# Patient Record
Sex: MALE | Race: WHITE | Employment: UNEMPLOYED | URBAN - METROPOLITAN AREA
[De-identification: names, ages, dates, MRNs, and addresses within clinical notes are randomized per-mention and may not be internally consistent; named-entity substitution may affect disease eponyms.]

---
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
## 2022-04-06 ENCOUNTER — HOSPITAL ENCOUNTER (EMERGENCY)
Age: 22
Discharge: HOME | End: 2022-04-06
Attending: EMERGENCY MEDICINE
Payer: COMMERCIAL

## 2022-04-06 ENCOUNTER — APPOINTMENT (OUTPATIENT)
Dept: CT IMAGING | Age: 22
End: 2022-04-06
Payer: COMMERCIAL

## 2022-04-06 VITALS
SYSTOLIC BLOOD PRESSURE: 104 MMHG | OXYGEN SATURATION: 99 % | HEART RATE: 88 BPM | TEMPERATURE: 99.7 F | DIASTOLIC BLOOD PRESSURE: 62 MMHG | RESPIRATION RATE: 16 BRPM

## 2022-04-06 DIAGNOSIS — K52.9 ENTEROCOLITIS: Primary | ICD-10-CM

## 2022-04-06 LAB
ANION GAP SERPL CALCULATED.3IONS-SCNC: 11 MMOL/L (ref 7–16)
BACTERIA: NORMAL /HPF
BASOPHILS ABSOLUTE: 0.03 E9/L (ref 0–0.2)
BASOPHILS RELATIVE PERCENT: 0.2 % (ref 0–2)
BILIRUBIN URINE: NEGATIVE
BLOOD, URINE: ABNORMAL
BUN BLDV-MCNC: 15 MG/DL (ref 6–20)
CALCIUM SERPL-MCNC: 9.2 MG/DL (ref 8.6–10.2)
CHLORIDE BLD-SCNC: 104 MMOL/L (ref 98–107)
CLARITY: CLEAR
CO2: 24 MMOL/L (ref 22–29)
COLOR: YELLOW
CREAT SERPL-MCNC: 0.9 MG/DL (ref 0.7–1.2)
EOSINOPHILS ABSOLUTE: 0.05 E9/L (ref 0.05–0.5)
EOSINOPHILS RELATIVE PERCENT: 0.4 % (ref 0–6)
GFR AFRICAN AMERICAN: >60
GFR NON-AFRICAN AMERICAN: >60 ML/MIN/1.73
GLUCOSE BLD-MCNC: 119 MG/DL (ref 74–99)
GLUCOSE URINE: NEGATIVE MG/DL
HCT VFR BLD CALC: 51.2 % (ref 37–54)
HEMOGLOBIN: 17.3 G/DL (ref 12.5–16.5)
IMMATURE GRANULOCYTES #: 0.03 E9/L
IMMATURE GRANULOCYTES %: 0.2 % (ref 0–5)
KETONES, URINE: NEGATIVE MG/DL
LACTIC ACID: 1 MMOL/L (ref 0.5–2.2)
LEUKOCYTE ESTERASE, URINE: NEGATIVE
LIPASE: 14 U/L (ref 13–60)
LYMPHOCYTES ABSOLUTE: 0.7 E9/L (ref 1.5–4)
LYMPHOCYTES RELATIVE PERCENT: 5.8 % (ref 20–42)
MCH RBC QN AUTO: 29.3 PG (ref 26–35)
MCHC RBC AUTO-ENTMCNC: 33.8 % (ref 32–34.5)
MCV RBC AUTO: 86.6 FL (ref 80–99.9)
MONOCYTES ABSOLUTE: 0.88 E9/L (ref 0.1–0.95)
MONOCYTES RELATIVE PERCENT: 7.3 % (ref 2–12)
NEUTROPHILS ABSOLUTE: 10.4 E9/L (ref 1.8–7.3)
NEUTROPHILS RELATIVE PERCENT: 86.1 % (ref 43–80)
NITRITE, URINE: NEGATIVE
PDW BLD-RTO: 12.4 FL (ref 11.5–15)
PH UA: 6 (ref 5–9)
PLATELET # BLD: 117 E9/L (ref 130–450)
PMV BLD AUTO: 11 FL (ref 7–12)
POTASSIUM SERPL-SCNC: 4.1 MMOL/L (ref 3.5–5)
PROTEIN UA: NEGATIVE MG/DL
RBC # BLD: 5.91 E12/L (ref 3.8–5.8)
RBC UA: NORMAL /HPF (ref 0–2)
SODIUM BLD-SCNC: 139 MMOL/L (ref 132–146)
SPECIFIC GRAVITY UA: >=1.03 (ref 1–1.03)
UROBILINOGEN, URINE: 0.2 E.U./DL
WBC # BLD: 12.1 E9/L (ref 4.5–11.5)
WBC UA: NORMAL /HPF (ref 0–5)

## 2022-04-06 PROCEDURE — 2580000003 HC RX 258: Performed by: EMERGENCY MEDICINE

## 2022-04-06 PROCEDURE — 6360000004 HC RX CONTRAST MEDICATION: Performed by: RADIOLOGY

## 2022-04-06 PROCEDURE — 96375 TX/PRO/DX INJ NEW DRUG ADDON: CPT

## 2022-04-06 PROCEDURE — 6360000002 HC RX W HCPCS: Performed by: EMERGENCY MEDICINE

## 2022-04-06 PROCEDURE — 96376 TX/PRO/DX INJ SAME DRUG ADON: CPT

## 2022-04-06 PROCEDURE — 85025 COMPLETE CBC W/AUTO DIFF WBC: CPT

## 2022-04-06 PROCEDURE — 83605 ASSAY OF LACTIC ACID: CPT

## 2022-04-06 PROCEDURE — 83690 ASSAY OF LIPASE: CPT

## 2022-04-06 PROCEDURE — 96372 THER/PROPH/DIAG INJ SC/IM: CPT

## 2022-04-06 PROCEDURE — 96374 THER/PROPH/DIAG INJ IV PUSH: CPT

## 2022-04-06 PROCEDURE — 80048 BASIC METABOLIC PNL TOTAL CA: CPT

## 2022-04-06 PROCEDURE — 81001 URINALYSIS AUTO W/SCOPE: CPT

## 2022-04-06 PROCEDURE — 74177 CT ABD & PELVIS W/CONTRAST: CPT

## 2022-04-06 PROCEDURE — 99283 EMERGENCY DEPT VISIT LOW MDM: CPT

## 2022-04-06 RX ORDER — MORPHINE SULFATE 2 MG/ML
2 INJECTION, SOLUTION INTRAMUSCULAR; INTRAVENOUS ONCE
Status: COMPLETED | OUTPATIENT
Start: 2022-04-06 | End: 2022-04-06

## 2022-04-06 RX ORDER — ONDANSETRON 2 MG/ML
INJECTION INTRAMUSCULAR; INTRAVENOUS
Status: DISCONTINUED
Start: 2022-04-06 | End: 2022-04-06 | Stop reason: HOSPADM

## 2022-04-06 RX ORDER — FLUTICASONE PROPIONATE 50 MCG
1 SPRAY, SUSPENSION (ML) NASAL EVERY MORNING
COMMUNITY

## 2022-04-06 RX ORDER — CETIRIZINE HYDROCHLORIDE 10 MG/1
10 TABLET ORAL EVERY MORNING
COMMUNITY

## 2022-04-06 RX ORDER — AMOXICILLIN AND CLAVULANATE POTASSIUM 875; 125 MG/1; MG/1
1 TABLET, FILM COATED ORAL 2 TIMES DAILY
Qty: 14 TABLET | Refills: 0 | Status: SHIPPED | OUTPATIENT
Start: 2022-04-06 | End: 2022-04-13

## 2022-04-06 RX ORDER — ONDANSETRON 4 MG/1
4 TABLET, ORALLY DISINTEGRATING ORAL EVERY 8 HOURS PRN
Qty: 10 TABLET | Refills: 0 | Status: SHIPPED | OUTPATIENT
Start: 2022-04-06

## 2022-04-06 RX ORDER — PROMETHAZINE HYDROCHLORIDE 25 MG/ML
12.5 INJECTION, SOLUTION INTRAMUSCULAR; INTRAVENOUS ONCE
Status: COMPLETED | OUTPATIENT
Start: 2022-04-06 | End: 2022-04-06

## 2022-04-06 RX ORDER — 0.9 % SODIUM CHLORIDE 0.9 %
1000 INTRAVENOUS SOLUTION INTRAVENOUS ONCE
Status: COMPLETED | OUTPATIENT
Start: 2022-04-06 | End: 2022-04-06

## 2022-04-06 RX ORDER — ONDANSETRON 2 MG/ML
4 INJECTION INTRAMUSCULAR; INTRAVENOUS EVERY 6 HOURS PRN
Status: DISCONTINUED | OUTPATIENT
Start: 2022-04-06 | End: 2022-04-06

## 2022-04-06 RX ORDER — ONDANSETRON 2 MG/ML
4 INJECTION INTRAMUSCULAR; INTRAVENOUS ONCE
Status: COMPLETED | OUTPATIENT
Start: 2022-04-06 | End: 2022-04-06

## 2022-04-06 RX ORDER — ALBUTEROL SULFATE 90 UG/1
1-2 AEROSOL, METERED RESPIRATORY (INHALATION)
COMMUNITY

## 2022-04-06 RX ORDER — AZITHROMYCIN 250 MG/1
250 TABLET, FILM COATED ORAL SEE ADMIN INSTRUCTIONS
COMMUNITY
Start: 2022-04-02 | End: 2022-04-06

## 2022-04-06 RX ADMIN — ONDANSETRON 4 MG: 2 INJECTION INTRAMUSCULAR; INTRAVENOUS at 06:43

## 2022-04-06 RX ADMIN — IOPAMIDOL 75 ML: 755 INJECTION, SOLUTION INTRAVENOUS at 07:37

## 2022-04-06 RX ADMIN — PROMETHAZINE HYDROCHLORIDE 12.5 MG: 25 INJECTION INTRAMUSCULAR; INTRAVENOUS at 09:49

## 2022-04-06 RX ADMIN — MORPHINE SULFATE 2 MG: 2 INJECTION, SOLUTION INTRAMUSCULAR; INTRAVENOUS at 09:32

## 2022-04-06 RX ADMIN — MORPHINE SULFATE 2 MG: 2 INJECTION, SOLUTION INTRAMUSCULAR; INTRAVENOUS at 06:48

## 2022-04-06 RX ADMIN — SODIUM CHLORIDE 1000 ML: 9 INJECTION, SOLUTION INTRAVENOUS at 06:42

## 2022-04-06 ASSESSMENT — PAIN - FUNCTIONAL ASSESSMENT: PAIN_FUNCTIONAL_ASSESSMENT: 0-10

## 2022-04-06 ASSESSMENT — PAIN DESCRIPTION - LOCATION: LOCATION: ABDOMEN

## 2022-04-06 ASSESSMENT — PAIN SCALES - GENERAL
PAINLEVEL_OUTOF10: 7

## 2022-04-06 ASSESSMENT — PAIN DESCRIPTION - PAIN TYPE: TYPE: ACUTE PAIN

## 2022-04-06 ASSESSMENT — PAIN DESCRIPTION - DESCRIPTORS: DESCRIPTORS: SHARP;SORE

## 2022-04-06 NOTE — ED PROVIDER NOTES
HPI:  4/6/22,   Time: 6:42 AM EDT         Larene Sandhoff is a 25 y.o. male presenting to the ED for RLQ pain NV , beginning 5 hours ago. The complaint has been persistent, moderate in severity, and worsened by nothing. Patient sudden onset of nausea vomiting and right lower quadrant pain. Was recently treated last week for strep throat with Zithromax. Does have a close contact with similar symptoms. Mom states he has had surgery of his umbilical hernia. No other abdominal surgeries reported. He had one episode of loose stool upon arrival.  He has been vomiting gastric contents,NBNB. He denies hematuria. No hematochezia or melena reported. He has no chest pain shortness of breath. He does have a anaphylactic reaction to Ceclor and peanut oil. ROS:   Pertinent positives and negatives are stated within HPI, all other systems reviewed and are negative.  --------------------------------------------- PAST HISTORY ---------------------------------------------  Past Medical History:  has a past medical history of Asthma and Autism. Past Surgical History:  has a past surgical history that includes Tympanostomy tube placement. Social History:  reports that he has never smoked. He has never used smokeless tobacco. He reports that he does not drink alcohol and does not use drugs. Family History: family history is not on file. The patients home medications have been reviewed.     Allergies: Bee venom, Ceclor [cefaclor], Peanuts [peanut oil], and [place] [macadamia nut oil]    -------------------------------------------------- RESULTS -------------------------------------------------  All laboratory and radiology results have been personally reviewed by myself   LABS:  Results for orders placed or performed during the hospital encounter of 04/06/22   CBC with Auto Differential   Result Value Ref Range    WBC 12.1 (H) 4.5 - 11.5 E9/L    RBC 5.91 (H) 3.80 - 5.80 E12/L    Hemoglobin 17.3 (H) 12.5 - 16.5 g/dL    Hematocrit 51.2 37.0 - 54.0 %    MCV 86.6 80.0 - 99.9 fL    MCH 29.3 26.0 - 35.0 pg    MCHC 33.8 32.0 - 34.5 %    RDW 12.4 11.5 - 15.0 fL    Platelets 004 (L) 431 - 450 E9/L    MPV 11.0 7.0 - 12.0 fL    Neutrophils % 86.1 (H) 43.0 - 80.0 %    Immature Granulocytes % 0.2 0.0 - 5.0 %    Lymphocytes % 5.8 (L) 20.0 - 42.0 %    Monocytes % 7.3 2.0 - 12.0 %    Eosinophils % 0.4 0.0 - 6.0 %    Basophils % 0.2 0.0 - 2.0 %    Neutrophils Absolute 10.40 (H) 1.80 - 7.30 E9/L    Immature Granulocytes # 0.03 E9/L    Lymphocytes Absolute 0.70 (L) 1.50 - 4.00 E9/L    Monocytes Absolute 0.88 0.10 - 0.95 E9/L    Eosinophils Absolute 0.05 0.05 - 0.50 E9/L    Basophils Absolute 0.03 0.00 - 0.20 L6/M   Basic Metabolic Panel   Result Value Ref Range    Sodium 139 132 - 146 mmol/L    Potassium 4.1 3.5 - 5.0 mmol/L    Chloride 104 98 - 107 mmol/L    CO2 24 22 - 29 mmol/L    Anion Gap 11 7 - 16 mmol/L    Glucose 119 (H) 74 - 99 mg/dL    BUN 15 6 - 20 mg/dL    CREATININE 0.9 0.7 - 1.2 mg/dL    GFR Non-African American >60 >=60 mL/min/1.73    GFR African American >60     Calcium 9.2 8.6 - 10.2 mg/dL   Lactic Acid   Result Value Ref Range    Lactic Acid 1.0 0.5 - 2.2 mmol/L   Urinalysis   Result Value Ref Range    Color, UA Yellow Straw/Yellow    Clarity, UA Clear Clear    Glucose, Ur Negative Negative mg/dL    Bilirubin Urine Negative Negative    Ketones, Urine Negative Negative mg/dL    Specific Gravity, UA >=1.030 1.005 - 1.030    Blood, Urine SMALL (A) Negative    pH, UA 6.0 5.0 - 9.0    Protein, UA Negative Negative mg/dL    Urobilinogen, Urine 0.2 <2.0 E.U./dL    Nitrite, Urine Negative Negative    Leukocyte Esterase, Urine Negative Negative   Lipase   Result Value Ref Range    Lipase 14 13 - 60 U/L   Microscopic Urinalysis   Result Value Ref Range    WBC, UA 0-1 0 - 5 /HPF    RBC, UA 2-5 0 - 2 /HPF    Bacteria, UA NONE SEEN None Seen /HPF       RADIOLOGY:  Interpreted by Radiologist.  Bernardo Newman IV CONTRAST Additional Contrast? None   Final Result   Mild fluid distention of multiple small bowel loops and the colon relatively   diffusely. The findings are suggestive of enterocolitis. Fluid-filled appendix which is at the upper limits of normal in size but   without adjacent fat stranding. This fluid distension is also favored to be   related to the apparent enterocolitis. Early changes of appendicitis thought   to be a less likely consideration but should be correlated with the patient's   clinical presentation. Short-term follow-up if symptoms persist.      RECOMMENDATIONS:   Unavailable             ------------------------- NURSING NOTES AND VITALS REVIEWED ---------------------------   The nursing notes within the ED encounter and vital signs as below have been reviewed. BP (!) 98/56   Pulse 92   Temp 99.7 °F (37.6 °C) (Oral)   Resp 16   SpO2 98%   Oxygen Saturation Interpretation: Normal      ---------------------------------------------------PHYSICAL EXAM--------------------------------------      Constitutional/General: Alert and oriented x3, Ill appearing. Head: NC/AT  Eyes: PERRL, EOMI  Mouth: Oropharynx clear, handling secretions, no trismus, MM dry   Neck: Supple, full ROM, no meningeal signs  Pulmonary: Lungs clear to auscultation bilaterally, no wheezes, rales, or rhonchi. Not in respiratory distress  Cardiovascular:  Regular rate and rhythm, no murmurs, gallops, or rubs. 2+ distal pulses  Abdomen: Soft, focal tenderness right lower quadrant, non distended,   Extremities: Moves all extremities x 4.  Warm and well perfused  Skin: warm and dry without rash  Neurologic: GCS 15, no focal deficits   Psych: Normal Affect      ------------------------------ ED COURSE/MEDICAL DECISION MAKING----------------------  Medications   ondansetron (ZOFRAN) injection 4 mg (4 mg IntraVENous Given 4/6/22 0643)   0.9 % sodium chloride bolus (0 mLs IntraVENous Stopped 4/6/22 0927)   morphine (PF) injection 2 mg (2 mg IntraVENous Given 4/6/22 0648)   iopamidol (ISOVUE-370) 76 % injection 75 mL (75 mLs IntraVENous Given 4/6/22 0737)   morphine (PF) injection 2 mg (2 mg IntraVENous Given 4/6/22 0932)   promethazine (PHENERGAN) injection 12.5 mg (12.5 mg IntraMUSCular Given 4/6/22 0949)         Medical Decision Making:    Labs IV fluids. Patient was given morphine and Zofran for pain and nausea. CT scan IV contrast pending for concern for appendicitis. Consultation was made with Dr. Toi Domínguez and discussed the patient with him and surgical resident. Patient is okay to follow-up as an outpatient. Be discharged home on Augmentin and Zofran ODT. Return instructions were given to the mom including fever worsening pain vomiting. Counseling: The emergency provider has spoken with the patient and family member patient and mother and discussed todays results, in addition to providing specific details for the plan of care and counseling regarding the diagnosis and prognosis. Questions are answered at this time and they are agreeable with the plan.      --------------------------------- IMPRESSION AND DISPOSITION ---------------------------------    IMPRESSION  1.  Enterocolitis New Problem       DISPOSITION  Disposition: Discharge to home  Patient condition is stable                Lucinda Lee DO  04/06/22 9087

## 2022-04-06 NOTE — CONSULTS
GENERAL SURGERY  CONSULT NOTE  4/6/2022    Physician Consulted: Dr. Tony Collins   Reason for Consult: possible acute appy  Referring Physician: Dr. Elmo Go     HPI  Marty Motley is a 25 y.o. male who presents for evaluation of abdominal pain over the last day. Pt states that he has been taking medication for strep throat and that he has been around sick contacts. He stated that he developed right later supraumbilical pain and epigastric pain around 1 am this morning. He states that this was associated with N/V and diarrhea. He has had a few watery BM since this morning. He is psoas and obturator sign negative. He currently states he is hungry as well. Past Medical History:   Diagnosis Date    Asthma     Autism        Past Surgical History:   Procedure Laterality Date    TYMPANOSTOMY TUBE PLACEMENT         Medications Prior to Admission:    Prior to Admission medications    Medication Sig Start Date End Date Taking?  Authorizing Provider   azithromycin (ZITHROMAX) 250 MG tablet Take 250 mg by mouth See Admin Instructions TAKE 2TPO DAY1 THEN, 1TPO DAYS 2-5 4/2/22 4/6/22 Yes Historical Provider, MD   cetirizine (ZYRTEC) 10 MG tablet Take 10 mg by mouth every morning   Yes Historical Provider, MD   albuterol sulfate HFA (VENTOLIN HFA) 108 (90 Base) MCG/ACT inhaler Inhale 1-2 puffs into the lungs every 4-6 hours as needed for Wheezing or Shortness of Breath   Yes Historical Provider, MD   EPINEPHrine HCl, Anaphylaxis, (EPIPEN 2-SANDEE IM) Inject 1 mL into the muscle as needed (ALLERGIC REACTION)   Yes Historical Provider, MD   fluticasone (FLONASE) 50 MCG/ACT nasal spray 1 spray by Each Nostril route every morning   Yes Historical Provider, MD   sodium chloride (OCEAN, BABY AYR) 0.65 % nasal spray 1 spray by Nasal route every morning   Yes Historical Provider, MD       Allergies   Allergen Reactions    Bee Venom Anaphylaxis    Ceclor [Cefaclor] Anaphylaxis    Peanuts [Peanut Oil] Anaphylaxis    [place] Nut Oil] Anaphylaxis       History reviewed. No pertinent family history. Social History     Tobacco Use    Smoking status: Never Smoker    Smokeless tobacco: Never Used   Vaping Use    Vaping Use: Never used   Substance Use Topics    Alcohol use: No    Drug use: No         Review of Systems   General ROS: negative  Hematological and Lymphatic ROS: negative  Respiratory ROS: negative  Cardiovascular ROS: negative  Gastrointestinal ROS: positive for - abdominal pain  Genito-Urinary ROS: negative  Musculoskeletal ROS: negative      PHYSICAL EXAM:    Vitals:    04/06/22 0933   BP: (!) 98/56   Pulse: 92   Resp: 16   Temp: 99.7 °F (37.6 °C)   SpO2: 98%       General Appearance:  awake, alert, oriented, in no acute distress  Skin:  Skin color, texture, turgor normal. No rashes or lesions. Head/face:  NCAT  Eyes:  PERRL  Lungs:  No chest wall tenderness. Heart:  Heart regular rate and rhythm  Abdomen:  Soft, non-distended, tender in epigastric and R lateral abdomen  Extremities: pulses present in all extremities      LABS:    CBC  Recent Labs     04/06/22  0647   WBC 12.1*   HGB 17.3*   HCT 51.2   *     BMP  Recent Labs     04/06/22  0647      K 4.1      CO2 24   BUN 15   CREATININE 0.9   CALCIUM 9.2     Liver Function  Recent Labs     04/06/22  0647   LIPASE 14     No results for input(s): LACTATE in the last 72 hours. No results for input(s): INR, PTT in the last 72 hours. Invalid input(s): PT    RADIOLOGY    CT ABDOMEN PELVIS W IV CONTRAST Additional Contrast? None    Result Date: 4/6/2022  EXAMINATION: CT OF THE ABDOMEN AND PELVIS WITH CONTRAST 4/6/2022 7:39 am TECHNIQUE: CT of the abdomen and pelvis was performed with the administration of intravenous contrast. Multiplanar reformatted images are provided for review. Dose modulation, iterative reconstruction, and/or weight based adjustment of the mA/kV was utilized to reduce the radiation dose to as low as reasonably achievable. COMPARISON: None. HISTORY: ORDERING SYSTEM PROVIDED HISTORY: RLQ pain NV TECHNOLOGIST PROVIDED HISTORY: Additional Contrast?->None Reason for exam:->RLQ pain NV Decision Support Exception - unselect if not a suspected or confirmed emergency medical condition->Emergency Medical Condition (MA) FINDINGS: Some images are degraded by patient motion. Gallbladder is unremarkable. Liver is homogeneous. Spleen is normal in size. Mild fatty replacement of the pancreas. No evidence of pancreatitis. No adrenal mass. No hydronephrosis. Assessment of bowel is limited without oral contrast.  Moderate fluid distention of small bowel and the colon relatively diffusely with multiple air-fluid levels. Findings are suggestive of mild enterocolitis. The appendix is also fluid filled and is at the upper limits of normal measuring 7 mm. No definitive adjacent fat stranding. Urinary bladder is unremarkable. Prostate is normal in size. No free fluid, free air or localized fluid collection. Scattered lymph nodes in the mesentery are favored to be reactive. Mild bilateral gynecomastia. Scattered areas of scarring and/or atelectasis in the lung bases. No acute bony abnormality. Mild fluid distention of multiple small bowel loops and the colon relatively diffusely. The findings are suggestive of enterocolitis. Fluid-filled appendix which is at the upper limits of normal in size but without adjacent fat stranding. This fluid distension is also favored to be related to the apparent enterocolitis. Early changes of appendicitis thought to be a less likely consideration but should be correlated with the patient's clinical presentation. Short-term follow-up if symptoms persist. RECOMMENDATIONS: Unavailable         ASSESSMENT:  25 y.o. male with abdominal pain, most likely enterocolitis.   Low suspicion of acute appendicitis    PLAN:  CT imaging reviewed and H/P not consistent with acute appy   Discussed with patient and mother Most likely viral enterocolitis, but given WBC and CT read will treat with abx for 5 days   Should follow up with Dr. Rowan Michelle if needed     Electronically signed by Lety Kowalski DO on 4/6/22 at 10:21 AM EDT

## 2022-04-12 ENCOUNTER — TELEPHONE (OUTPATIENT)
Dept: HEMATOLOGY | Age: 22
End: 2022-04-12

## 2022-04-12 NOTE — TELEPHONE ENCOUNTER
Patients mother called in to make her son an appt as a new patient with Dr. Damien Vega for abdominal pain. I made him an appt on 4/29/22 at 2:00pm at [facility]. She confirmed this appt and is aware of the office location.     Electronically signed by Huy Mcgarry RN on 4/12/2022 at 11:05 AM

## 2022-05-23 ENCOUNTER — HOSPITAL ENCOUNTER (OUTPATIENT)
Age: 22
Discharge: HOME | End: 2022-05-25

## 2022-05-23 PROCEDURE — 88305 TISSUE EXAM BY PATHOLOGIST: CPT

## 2025-05-07 ENCOUNTER — OFFICE VISIT (OUTPATIENT)
Dept: SURGERY | Age: 25
End: 2025-05-07
Payer: COMMERCIAL

## 2025-05-07 VITALS
BODY MASS INDEX: 33.28 KG/M2 | DIASTOLIC BLOOD PRESSURE: 78 MMHG | WEIGHT: 207.1 LBS | RESPIRATION RATE: 18 BRPM | SYSTOLIC BLOOD PRESSURE: 108 MMHG | HEIGHT: 66 IN | HEART RATE: 79 BPM | TEMPERATURE: 98.4 F | OXYGEN SATURATION: 98 %

## 2025-05-07 DIAGNOSIS — L98.9 SKIN LESION: Primary | ICD-10-CM

## 2025-05-07 PROCEDURE — 99203 OFFICE O/P NEW LOW 30 MIN: CPT | Performed by: PHYSICIAN ASSISTANT

## 2025-05-07 NOTE — PROGRESS NOTES
children: Not on file    Years of education: Not on file    Highest education level: Not on file   Occupational History    Not on file   Tobacco Use    Smoking status: Never    Smokeless tobacco: Never   Vaping Use    Vaping status: Never Used   Substance and Sexual Activity    Alcohol use: No    Drug use: No    Sexual activity: Never   Other Topics Concern    Not on file   Social History Narrative    Not on file     Social Drivers of Health     Financial Resource Strain: Not on file   Food Insecurity: Not on file   Transportation Needs: Not on file   Physical Activity: Not on file   Stress: Not on file   Social Connections: Not on file   Intimate Partner Violence: Not on file   Housing Stability: Not on file     Family History:   Family History   Problem Relation Age of Onset    No Known Problems Mother     High Cholesterol Father     Hypertension Father        REVIEW OF SYSTEMS:    CONSTITUTIONAL:  negative for  fevers, chills, sweats and fatigue  EYES: negative for dipolpia or acute vision loss.   RESPIRATORY:  negative for  dry cough, cough with sputum, dyspnea, wheezing and chest pain  HENT:negative for pain, headache, difficulty swallowing or nose bleeds.  CARDIOVASCULAR:  negative for  chest pain, dyspnea, palpitations, syncope  GASTROINTESTINAL:  negative for nausea, vomiting, change in bowel habits, diarrhea, constipation and abdominal pain  EXTREMITIES: negative for edema  MUSCULOSKELETAL: negative for muscle weakness  SKIN: positive for lesion,negative for itching or rashes.  HEME: negative for easy brusing or bleeding  BEHAVIOR/PSYCH:  negative for poor appetite, increased appetite, decreased sleep and poor concentration    PHYSICAL EXAM:        VITALS:  /78 (BP Site: Left Upper Arm, Patient Position: Sitting, BP Cuff Size: Medium Adult)   Pulse 79   Temp 98.4 °F (36.9 °C) (Infrared)   Resp 18   Ht 1.676 m (5' 6\")   Wt 93.9 kg (207 lb 1.6 oz)   SpO2 98%   BMI 33.43 kg/m²   CONSTITUTIONAL: